# Patient Record
Sex: FEMALE | Race: WHITE | NOT HISPANIC OR LATINO | URBAN - METROPOLITAN AREA
[De-identification: names, ages, dates, MRNs, and addresses within clinical notes are randomized per-mention and may not be internally consistent; named-entity substitution may affect disease eponyms.]

---

## 2023-09-08 PROBLEM — R93.89 THICKENED ENDOMETRIUM: Status: ACTIVE | Noted: 2023-09-08

## 2023-09-08 PROBLEM — R00.2 PALPITATION: Status: ACTIVE | Noted: 2023-09-08

## 2023-09-08 PROBLEM — R43.2 LOSS OF TASTE: Status: ACTIVE | Noted: 2023-09-08

## 2023-09-08 PROBLEM — R06.09 DOE (DYSPNEA ON EXERTION): Status: ACTIVE | Noted: 2023-09-08

## 2023-09-08 PROBLEM — M81.0 AGE-RELATED OSTEOPOROSIS WITHOUT CURRENT PATHOLOGICAL FRACTURE: Status: ACTIVE | Noted: 2023-09-08

## 2023-09-08 PROBLEM — G89.29 OTHER CHRONIC PAIN: Status: ACTIVE | Noted: 2023-09-08

## 2023-09-08 PROBLEM — E06.3 AUTOIMMUNE THYROIDITIS: Status: ACTIVE | Noted: 2023-09-08

## 2023-09-08 PROBLEM — N95.1 MENOPAUSAL AND FEMALE CLIMACTERIC STATES: Status: ACTIVE | Noted: 2023-09-08

## 2023-09-08 PROBLEM — E05.00 THYROTOXICOSIS WITH DIFFUSE GOITER WITHOUT THYROTOXIC CRISIS OR STORM: Status: ACTIVE | Noted: 2023-09-08

## 2023-09-08 PROBLEM — E78.5 HYPERLIPIDEMIA: Status: ACTIVE | Noted: 2023-09-08

## 2023-09-08 PROBLEM — R43.0 ANOSMIA: Status: ACTIVE | Noted: 2023-09-08

## 2023-09-08 RX ORDER — PEN NEEDLE, DIABETIC 30 GX3/16"
NEEDLE, DISPOSABLE MISCELLANEOUS DAILY
COMMUNITY
Start: 2023-02-01

## 2023-09-08 RX ORDER — NICOTINE POLACRILEX 2 MG
GUM BUCCAL
COMMUNITY

## 2023-09-08 RX ORDER — PSYLLIUM HUSK 0.4 G
1 CAPSULE ORAL
COMMUNITY

## 2023-09-08 RX ORDER — TERIPARATIDE 250 UG/ML
0.08 INJECTION, SOLUTION SUBCUTANEOUS DAILY
COMMUNITY
Start: 2023-04-24 | End: 2024-04-15

## 2023-09-08 RX ORDER — CICLOPIROX 80 MG/ML
1 SOLUTION TOPICAL
COMMUNITY
Start: 2023-01-30 | End: 2024-02-07

## 2024-01-31 ENCOUNTER — LAB (OUTPATIENT)
Dept: LAB | Facility: LAB | Age: 54
End: 2024-01-31
Payer: COMMERCIAL

## 2024-01-31 DIAGNOSIS — E06.3 AUTOIMMUNE THYROIDITIS: Primary | ICD-10-CM

## 2024-01-31 LAB — TSH SERPL-ACNC: 1.7 MIU/L (ref 0.44–3.98)

## 2024-01-31 PROCEDURE — 84443 ASSAY THYROID STIM HORMONE: CPT

## 2024-01-31 PROCEDURE — 36415 COLL VENOUS BLD VENIPUNCTURE: CPT

## 2024-02-07 ENCOUNTER — OFFICE VISIT (OUTPATIENT)
Dept: ENDOCRINOLOGY | Facility: CLINIC | Age: 54
End: 2024-02-07
Payer: COMMERCIAL

## 2024-02-07 VITALS
SYSTOLIC BLOOD PRESSURE: 140 MMHG | HEART RATE: 90 BPM | WEIGHT: 135 LBS | DIASTOLIC BLOOD PRESSURE: 85 MMHG | BODY MASS INDEX: 23.17 KG/M2

## 2024-02-07 DIAGNOSIS — E05.00 THYROTOXICOSIS WITH DIFFUSE GOITER WITHOUT THYROTOXIC CRISIS OR STORM: ICD-10-CM

## 2024-02-07 DIAGNOSIS — M81.0 AGE-RELATED OSTEOPOROSIS WITHOUT CURRENT PATHOLOGICAL FRACTURE: Primary | ICD-10-CM

## 2024-02-07 PROCEDURE — 99213 OFFICE O/P EST LOW 20 MIN: CPT | Performed by: NURSE PRACTITIONER

## 2024-02-07 PROCEDURE — 1036F TOBACCO NON-USER: CPT | Performed by: NURSE PRACTITIONER

## 2024-02-07 RX ORDER — VENLAFAXINE HYDROCHLORIDE 37.5 MG/1
37.5 CAPSULE, EXTENDED RELEASE ORAL EVERY 24 HOURS
COMMUNITY

## 2024-02-07 ASSESSMENT — PATIENT HEALTH QUESTIONNAIRE - PHQ9
1. LITTLE INTEREST OR PLEASURE IN DOING THINGS: NOT AT ALL
2. FEELING DOWN, DEPRESSED OR HOPELESS: NOT AT ALL
SUM OF ALL RESPONSES TO PHQ9 QUESTIONS 1 AND 2: 0

## 2024-02-07 ASSESSMENT — PAIN SCALES - GENERAL: PAINLEVEL: 0-NO PAIN

## 2024-02-07 ASSESSMENT — ENCOUNTER SYMPTOMS: DEPRESSION: 0

## 2024-02-07 NOTE — PATIENT INSTRUCTIONS
FOLLOW UP WITH ME IN DECEMBER 2024   PLAN TO HAVE RECLAST INFUSIONS X 3 (YEARLY INFUSION)  DEXA SCAN SCHEDULE SOMETIME IN OCTOBER-NOVEMBER 2024   THYROID LAB BEFORE NEXT VISIT

## 2024-02-07 NOTE — PROGRESS NOTES
"HPI   Presents for follow up/metabolic management. 52yo with history of Graves, now in remission, completed Methimazole back in 2018. She continues to be in a eurthyroid state, no eye complaints, no obstructive symptoms.    She has not had fractures. Her BMD in August 2022 showed a T score of-2.9 at the hip , -2.7 at the lumbar spine. Her Z score was -2.0 at the hip. Her labs showed no secondary cause for bone loss, besides her history of Graves disease and menopausal status. Celiac serology was negative.    Previously her insurance refused coverage for the Tymlos. She was able to get on an assistance program with [employer] which is affordable. She did start the daily injection May 2023.      Current Outpatient Medications:     abaloparatide 80 mcg (3,120 mcg/1.56 mL) pen injector, Inject under the skin once every 24 hours., Disp: , Rfl:     APPLE CIDER VINEGAR ORAL, Apple Cider Vinegar, Disp: , Rfl:     ascorbic acid (Vitamin C) 100 mg tablet, As directed no dose given, Disp: , Rfl:     Bacillus coagulans (PROBIOTIC, B. COAGULANS, ORAL), As directed, Disp: , Rfl:     biotin 1 mg capsule, Biotin, Disp: , Rfl:     calcium carbonate-vitamin D3 1,000 mg-20 mcg (800 unit) tablet, Take 1 tablet by mouth once daily at noon. Take with meals., Disp: , Rfl:     BINA ROOT EXTRACT ORAL, Bina, Disp: , Rfl:     pen needle, diabetic 32 gauge x 5/32\" needle, once daily., Disp: , Rfl:     teriparatide (Forteo) injection, Inject 0.08 mL (20 mcg) under the skin once daily., Disp: , Rfl:     venlafaxine XR (Effexor-XR) 37.5 mg 24 hr capsule, Take 1 capsule (37.5 mg) by mouth once every 24 hours., Disp: , Rfl:     vit C/zinc citrate/elderberry (SAMBUCUS ELDERBERRY ORAL), As directed, Disp: , Rfl:       Allergies as of 02/07/2024    (No Known Allergies)         Review of Systems   Cardiology: Lightheadedness-denies.  Chest pain-denies.  Leg edema-denies.  Palpitations-denies.  Respiratory: Cough-denies. Shortness of breath-denies.  " Wheezing-denies.  Gastroenterology: Constipation-denies.  Diarrhea-denies.  Heartburn-denies.  Endocrinology: Cold intolerance-denies.  Heat intolerance-denies.  Sweats-denies.  Neurology: Headache-denies.  Tremor-denies.  Neuropathy in extremities-denies.  Psychology: Low energy-denies.  Irritability-denies.  Sleep disturbances-denies.      /85 (BP Location: Left arm, Patient Position: Sitting)   Pulse 90   Wt 61.2 kg (135 lb)   BMI 23.17 kg/m²       Labs:      Lab Results   Component Value Date    TSH 1.70 01/31/2024           Physical Exam   General Appearance: pleasant, cooperative, no acute distress  HEENT: no chemosis, no proptosis, no lid lag, no lid retraction  Neck: no lymphadenopathy, no thyromegaly, no dominant thyroid nodules  Heart: no murmurs, regular rate and rhythm, S1 and S2  Lungs: no wheezes, no rhonci, no rales  Extremities: no lower extremity swelling      Assessment/Plan   1. Thyrotoxicosis with diffuse goiter without thyrotoxic crisis or storm  -euthyroid  -no compressive/obstructive neck complaints    2. Age-related osteoporosis without current pathological fracture  -continue Forteo through November 2024  -repeat DEXA Oct/Nov 2024  -FU me Dec 2024  -plan for Reclast infusion x 3 years     Follow Up: Dec 2024    -labs/tests/notes reviewed  -reviewed and counseled patient on medication monitoring and side effects

## 2024-04-15 ENCOUNTER — SPECIALTY PHARMACY (OUTPATIENT)
Dept: PHARMACY | Facility: CLINIC | Age: 54
End: 2024-04-15

## 2024-04-15 DIAGNOSIS — M81.0 AGE-RELATED OSTEOPOROSIS WITHOUT CURRENT PATHOLOGICAL FRACTURE: Primary | ICD-10-CM

## 2024-04-15 RX ORDER — TERIPARATIDE 250 UG/ML
INJECTION, SOLUTION SUBCUTANEOUS
Qty: 2.4 ML | Refills: 6 | Status: SHIPPED | OUTPATIENT
Start: 2024-04-15

## 2024-10-07 ENCOUNTER — TELEPHONE (OUTPATIENT)
Dept: ENDOCRINOLOGY | Facility: CLINIC | Age: 54
End: 2024-10-07
Payer: COMMERCIAL

## 2024-12-06 ENCOUNTER — APPOINTMENT (OUTPATIENT)
Dept: ENDOCRINOLOGY | Facility: CLINIC | Age: 54
End: 2024-12-06
Payer: COMMERCIAL